# Patient Record
Sex: FEMALE | Race: WHITE | NOT HISPANIC OR LATINO | Employment: OTHER | URBAN - METROPOLITAN AREA
[De-identification: names, ages, dates, MRNs, and addresses within clinical notes are randomized per-mention and may not be internally consistent; named-entity substitution may affect disease eponyms.]

---

## 2017-03-01 ENCOUNTER — HOSPITAL ENCOUNTER (EMERGENCY)
Facility: HOSPITAL | Age: 42
Discharge: HOME | End: 2017-03-01
Attending: FAMILY MEDICINE | Admitting: FAMILY MEDICINE

## 2017-03-01 ENCOUNTER — APPOINTMENT (OUTPATIENT)
Dept: ULTRASOUND IMAGING | Facility: HOSPITAL | Age: 42
End: 2017-03-01

## 2017-03-01 VITALS
DIASTOLIC BLOOD PRESSURE: 83 MMHG | BODY MASS INDEX: 28.34 KG/M2 | RESPIRATION RATE: 19 BRPM | WEIGHT: 154 LBS | TEMPERATURE: 97.2 F | OXYGEN SATURATION: 98 % | HEART RATE: 72 BPM | SYSTOLIC BLOOD PRESSURE: 122 MMHG | HEIGHT: 62 IN

## 2017-03-01 DIAGNOSIS — N93.9 VAGINAL BLEEDING: Primary | ICD-10-CM

## 2017-03-01 LAB
ANION GAP SERPL CALCULATED.3IONS-SCNC: 14.6 MMOL/L
BASOPHILS # BLD AUTO: 0.04 10*3/MM3 (ref 0–0.2)
BASOPHILS NFR BLD AUTO: 0.5 % (ref 0–1.5)
BUN BLD-MCNC: 9 MG/DL (ref 6–20)
BUN/CREAT SERPL: 10.7 (ref 7–25)
CALCIUM SPEC-SCNC: 8.7 MG/DL (ref 8.6–10.5)
CHLORIDE SERPL-SCNC: 102 MMOL/L (ref 98–107)
CO2 SERPL-SCNC: 25.4 MMOL/L (ref 22–29)
CREAT BLD-MCNC: 0.84 MG/DL (ref 0.57–1)
DEPRECATED RDW RBC AUTO: 45.5 FL (ref 37–54)
EOSINOPHIL # BLD AUTO: 0.14 10*3/MM3 (ref 0–0.7)
EOSINOPHIL NFR BLD AUTO: 1.6 % (ref 0.3–6.2)
ERYTHROCYTE [DISTWIDTH] IN BLOOD BY AUTOMATED COUNT: 13.2 % (ref 11.7–13)
GFR SERPL CREATININE-BSD FRML MDRD: 74 ML/MIN/1.73
GLUCOSE BLD-MCNC: 81 MG/DL (ref 65–99)
HCG SERPL QL: NEGATIVE
HCT VFR BLD AUTO: 37.1 % (ref 35.6–45.5)
HGB BLD-MCNC: 12.3 G/DL (ref 11.9–15.5)
IMM GRANULOCYTES # BLD: 0.03 10*3/MM3 (ref 0–0.03)
IMM GRANULOCYTES NFR BLD: 0.4 % (ref 0–0.5)
LYMPHOCYTES # BLD AUTO: 2.2 10*3/MM3 (ref 0.9–4.8)
LYMPHOCYTES NFR BLD AUTO: 25.7 % (ref 19.6–45.3)
MCH RBC QN AUTO: 31.9 PG (ref 26.9–32)
MCHC RBC AUTO-ENTMCNC: 33.2 G/DL (ref 32.4–36.3)
MCV RBC AUTO: 96.4 FL (ref 80.5–98.2)
MONOCYTES # BLD AUTO: 0.69 10*3/MM3 (ref 0.2–1.2)
MONOCYTES NFR BLD AUTO: 8.1 % (ref 5–12)
NEUTROPHILS # BLD AUTO: 5.47 10*3/MM3 (ref 1.9–8.1)
NEUTROPHILS NFR BLD AUTO: 63.7 % (ref 42.7–76)
PLATELET # BLD AUTO: 301 10*3/MM3 (ref 140–500)
PMV BLD AUTO: 11.2 FL (ref 6–12)
POTASSIUM BLD-SCNC: 3.8 MMOL/L (ref 3.5–5.2)
RBC # BLD AUTO: 3.85 10*6/MM3 (ref 3.9–5.2)
SODIUM BLD-SCNC: 142 MMOL/L (ref 136–145)
WBC NRBC COR # BLD: 8.57 10*3/MM3 (ref 4.5–10.7)
WHOLE BLOOD HOLD SPECIMEN: NORMAL

## 2017-03-01 PROCEDURE — 76830 TRANSVAGINAL US NON-OB: CPT

## 2017-03-01 PROCEDURE — 99283 EMERGENCY DEPT VISIT LOW MDM: CPT

## 2017-03-01 PROCEDURE — 36415 COLL VENOUS BLD VENIPUNCTURE: CPT | Performed by: FAMILY MEDICINE

## 2017-03-01 PROCEDURE — 85025 COMPLETE CBC W/AUTO DIFF WBC: CPT | Performed by: FAMILY MEDICINE

## 2017-03-01 PROCEDURE — 80048 BASIC METABOLIC PNL TOTAL CA: CPT | Performed by: FAMILY MEDICINE

## 2017-03-01 PROCEDURE — 84703 CHORIONIC GONADOTROPIN ASSAY: CPT | Performed by: FAMILY MEDICINE

## 2017-03-01 PROCEDURE — 76856 US EXAM PELVIC COMPLETE: CPT

## 2017-03-01 PROCEDURE — 93976 VASCULAR STUDY: CPT

## 2017-03-01 RX ORDER — LEVOTHYROXINE SODIUM 0.12 MG/1
125 TABLET ORAL DAILY
COMMUNITY

## 2017-03-01 RX ORDER — OMEPRAZOLE 20 MG/1
20 CAPSULE, DELAYED RELEASE ORAL DAILY
COMMUNITY
End: 2017-03-09

## 2017-03-01 RX ORDER — SODIUM CHLORIDE 0.9 % (FLUSH) 0.9 %
10 SYRINGE (ML) INJECTION AS NEEDED
Status: DISCONTINUED | OUTPATIENT
Start: 2017-03-01 | End: 2017-03-01 | Stop reason: HOSPADM

## 2017-03-01 RX ORDER — ERGOCALCIFEROL 1.25 MG/1
50000 CAPSULE ORAL WEEKLY
COMMUNITY
End: 2017-03-09

## 2017-03-01 RX ORDER — PROPRANOLOL HYDROCHLORIDE 80 MG/1
80 CAPSULE, EXTENDED RELEASE ORAL DAILY
COMMUNITY

## 2017-03-01 RX ORDER — FENOFIBRATE 145 MG/1
145 TABLET, COATED ORAL DAILY
COMMUNITY

## 2017-03-01 NOTE — ED PROVIDER NOTES
EMERGENCY DEPARTMENT ENCOUNTER    CHIEF COMPLAINT  Chief Complaint: Vaginal bleeding  History given by: Pt  History limited by: N/A  Room Number: 14/14  PMD: Fernie Nicole MD      HPI:  Pt is a 41 y.o. female who presents complaining of persistent vaginal spotting since her LMP ended Jan 12th. Pt also c/o abd cramping since Feb 23rd. She was seen by her urologist earlier today, who told the pt to call her OBGYN as there were no issues with her kidneys. Dr. Flores is her OBGYN. Pt was advised by their office to come here, as she was unable to get an appointment in the office until next week. Pt has a history of thyroid cancer 3 times.    Duration: 1.5 month  Onset: Sudden  Timing: Constant  Location:   Radiation: None  Quality: Spotting  Intensity/Severity: Mild  Progression: Unchanged  Associated Symptoms: Abd cramping  Aggravating Factors: Nothing  Alleviating Factors: Nothing  Previous Episodes: None specified  Treatment before arrival: Seen by Nephrologist and OBGYN's office    PAST MEDICAL HISTORY  Active Ambulatory Problems     Diagnosis Date Noted   • No Active Ambulatory Problems     Resolved Ambulatory Problems     Diagnosis Date Noted   • No Resolved Ambulatory Problems     Past Medical History   Diagnosis Date   • Arthritis    • Cancer    • Disease of thyroid gland    • GERD (gastroesophageal reflux disease)    • Hernia    • Kidney stone    • Spinal stenosis        PAST SURGICAL HISTORY  Past Surgical History   Procedure Laterality Date   • Thyroid surgery     • Cholecystectomy     • Appendectomy     • Tubal abdominal ligation     • Colonoscopy         FAMILY HISTORY  History reviewed. No pertinent family history.    SOCIAL HISTORY  Social History     Social History   • Marital status:      Spouse name: N/A   • Number of children: N/A   • Years of education: N/A     Occupational History   • Not on file.     Social History Main Topics   • Smoking status: Never Smoker   • Smokeless  tobacco: Not on file   • Alcohol use No   • Drug use: No   • Sexual activity: Not on file     Other Topics Concern   • Not on file     Social History Narrative   • No narrative on file       ALLERGIES  Review of patient's allergies indicates no known allergies.    REVIEW OF SYSTEMS  Review of Systems   Constitutional: Negative for chills and fever.   HENT: Negative for sore throat and trouble swallowing.    Eyes: Negative for visual disturbance.   Respiratory: Negative for cough and shortness of breath.    Cardiovascular: Negative for chest pain, palpitations and leg swelling.   Gastrointestinal: Positive for abdominal pain (cramping). Negative for diarrhea and vomiting.   Endocrine: Negative.    Genitourinary: Positive for vaginal bleeding (spotting). Negative for decreased urine volume, dysuria and frequency.   Musculoskeletal: Negative for neck pain.   Skin: Negative for rash.   Allergic/Immunologic: Negative.    Neurological: Negative for syncope, weakness, numbness and headaches.   Hematological: Negative.    Psychiatric/Behavioral: Negative.    All other systems reviewed and are negative.      PHYSICAL EXAM  ED Triage Vitals   Temp Heart Rate Resp BP SpO2   03/01/17 1344 03/01/17 1344 03/01/17 1404 03/01/17 1404 03/01/17 1344   97.6 °F (36.4 °C) 67 18 161/100 100 %      Temp src Heart Rate Source Patient Position BP Location FiO2 (%)   -- 03/01/17 1536 03/01/17 1404 03/01/17 1404 --    Monitor Sitting Right arm        Physical Exam   Constitutional: She is oriented to person, place, and time and well-developed, well-nourished, and in no distress. No distress.   HENT:   Head: Normocephalic and atraumatic.   Eyes: EOM are normal. Pupils are equal, round, and reactive to light.   Neck: Normal range of motion. Neck supple.   Cardiovascular: Normal rate, regular rhythm and normal heart sounds.    Pulmonary/Chest: Effort normal and breath sounds normal. No respiratory distress.   Abdominal: Soft. There is no  tenderness (no significant tenderness). There is no rebound, no guarding and no CVA tenderness.   Musculoskeletal: Normal range of motion. She exhibits no edema.   Neurological: She is alert and oriented to person, place, and time. She has normal sensation and normal strength.   Skin: Skin is warm and dry. No rash noted.   Psychiatric: Mood and affect normal.   Nursing note and vitals reviewed.      LAB RESULTS  Lab Results (last 24 hours)     Procedure Component Value Units Date/Time    CBC & Differential [[id number]] Collected:  03/01/17 1425    Specimen:  Blood Updated:  03/01/17 1503    Narrative:       The following orders were created for panel order CBC & Differential.  Procedure                               Abnormality         Status                     ---------                               -----------         ------                     CBC Auto Differential[[id number]]         Abnormal            Final result                 Please view results for these tests on the individual orders.    Basic Metabolic Panel [[id number]] Collected:  03/01/17 1425    Specimen:  Blood Updated:  03/01/17 1504     Glucose 81 mg/dL      BUN 9 mg/dL      Creatinine 0.84 mg/dL      Sodium 142 mmol/L      Potassium 3.8 mmol/L      Chloride 102 mmol/L      CO2 25.4 mmol/L      Calcium 8.7 mg/dL      eGFR Non African Amer 74 mL/min/1.73      BUN/Creatinine Ratio 10.7      Anion Gap 14.6 mmol/L     Narrative:       GFR Normal >60  Chronic Kidney Disease <60  Kidney Failure <15    hCG, Serum, Qualitative [[id number]]  (Normal) Collected:  03/01/17 1425    Specimen:  Blood Updated:  03/01/17 1453     HCG Qualitative Negative     CBC Auto Differential [[id number]]  (Abnormal) Collected:  03/01/17 1425    Specimen:  Blood Updated:  03/01/17 1503     WBC 8.57 10*3/mm3      RBC 3.85 (L) 10*6/mm3      Hemoglobin 12.3 g/dL      Hematocrit 37.1 %      MCV 96.4 fL      MCH 31.9 pg      MCHC 33.2 g/dL      RDW 13.2 (H) %      RDW-SD 45.5 fl      MPV  11.2 fL      Platelets 301 10*3/mm3      Neutrophil % 63.7 %      Lymphocyte % 25.7 %      Monocyte % 8.1 %      Eosinophil % 1.6 %      Basophil % 0.5 %      Immature Grans % 0.4 %      Neutrophils, Absolute 5.47 10*3/mm3      Lymphocytes, Absolute 2.20 10*3/mm3      Monocytes, Absolute 0.69 10*3/mm3      Eosinophils, Absolute 0.14 10*3/mm3      Basophils, Absolute 0.04 10*3/mm3      Immature Grans, Absolute 0.03 10*3/mm3           I ordered the above labs and reviewed the results    RADIOLOGY  US Pelvis Complete   Preliminary Result   Largely unremarkable pelvic sonogram. A small fibroid is   noted within the posterior corpus. The endometrium is up to 2 cm in   thickness, consistent with patient's reported LMP of 01/07/2017. There   is a 2 cm slightly complex cyst at the right ovary. Numerous internal   septations are present and I suspect this represents a resolving   hemorrhagic cyst. Followup pelvic sonography could be obtained to   document resolution.       NOTE: These findings and recommendations were discussed with Dr. Fantasma Abdullahi of the ER at the time of the dictation.          US Non-ob Transvaginal    (Results Pending)   US Testicular or Ovarian Vascular Limited    (Results Pending)   Ultrasound Pelvis:   Shows small hemorrhagic cyst on the R that appears to be resolving, small fibroid    I ordered the above noted radiological studies. Interpreted by radiologist. Discussed with radiologist (Dr. Denny). Reviewed by me in PACS.       PROCEDURES  Procedures      PROGRESS AND CONSULTS  ED Course   3:57 PM:  Vitals: BP: 128/80 HR: 67 Temp: 97.6 °F (36.4 °C) O2 sat: 97%  D/w pt plan for labs and Pelvic Ultrasound for further evaluation. Pt understands and agrees with the plan, all questions answered.    5:08 PM:  Vitals: BP: 138/85 HR: 85 Temp: 97.6 °F (36.4 °C) O2 sat: 98%  Rechecked pt. Pt is resting comfortably. Discussed results of labs and imaging, which showed small hemorrhagic cyst that appears to  be resolving and a small fibroid. Also discussed the plan for discharge.  She is to see hr GYN MD next week. Pt understands and agrees with the plan, all questions answered.    MEDICAL DECISION MAKING  Results were reviewed/discussed with the patient and they were also made aware of online access. Pt also made aware that some labs, such as cultures, will not be resulted during ER visit and follow up with PMD is necessary.     MDM  Number of Diagnoses or Management Options     Amount and/or Complexity of Data Reviewed  Clinical lab tests: ordered and reviewed (Normal)  Tests in the radiology section of CPT®: ordered and reviewed (Ultrasound Pelvis: Shows small hemorrhagic cyst on the R that appears to be resolving, small fibroid)  Discussion of test results with the performing providers: yes  Decide to obtain previous medical records or to obtain history from someone other than the patient: yes  Review and summarize past medical records: yes (Pt had an ultrasound 3 weeks ago, which showed an 8 mm stone in the lower pole of the L kidney, which was unchanged from previous. Her urine was clear at that time.)  Independent visualization of images, tracings, or specimens: yes    Patient Progress  Patient progress: stable         DIAGNOSIS  Final diagnoses:   Vaginal bleeding       DISPOSITION  DISCHARGE    Patient discharged in stable condition.    Reviewed implications of results, diagnosis, meds, responsibility to follow up, warning signs and symptoms of possible worsening, potential complications and reasons to return to ER, including any new or worsening symptoms.    Patient/Family voiced understanding of above instructions.    Discussed plan for discharge, as there is no emergent indication for admission.  Pt/family is agreeable and understands need for follow up and repeat testing.  Pt is aware that discharge does not mean that nothing is wrong but it indicates no emergency is present that requires admission and they  must continue care with follow-up as given below or physician of their choice.     FOLLOW-UP  Renny Garcia MD  [facility]   [place] [address] [place] [id number]  [phone]      Keep your appt         Medication List      Notice     No changes were made to your prescriptions during this visit.        Latest Documented Vital Signs:  As of 8:08 PM  BP- 122/83 HR- 72 Temp- 97.2 °F (36.2 °C) (Tympanic) O2 sat- 98%    --  Documentation assistance provided by tabatha Palmer for Dr. Abdullahi.  Information recorded by the scribe was done at my direction and has been verified and validated by me.     Elvin Palmer  03/01/17 1711       Fantasma Abdullahi MD  03/01/17 2009

## 2017-03-01 NOTE — ED NOTES
Spotting blood entire month of Feb. Also have abd cramping/low back pains. Pt called her Gyno who referred to LISHA Bullock RN  03/01/17 9094

## 2017-03-09 ENCOUNTER — OFFICE VISIT (OUTPATIENT)
Dept: OBSTETRICS AND GYNECOLOGY | Facility: CLINIC | Age: 42
End: 2017-03-09

## 2017-03-09 VITALS
SYSTOLIC BLOOD PRESSURE: 121 MMHG | HEIGHT: 62 IN | HEART RATE: 65 BPM | BODY MASS INDEX: 27.97 KG/M2 | WEIGHT: 152 LBS | DIASTOLIC BLOOD PRESSURE: 78 MMHG

## 2017-03-09 DIAGNOSIS — N92.1 MENOMETRORRHAGIA: Primary | ICD-10-CM

## 2017-03-09 DIAGNOSIS — Z11.3 SCREEN FOR STD (SEXUALLY TRANSMITTED DISEASE): ICD-10-CM

## 2017-03-09 DIAGNOSIS — R23.2 HOT FLASHES: ICD-10-CM

## 2017-03-09 DIAGNOSIS — E07.9 THYROID DISEASE: ICD-10-CM

## 2017-03-09 DIAGNOSIS — N89.8 VAGINAL DISCHARGE: ICD-10-CM

## 2017-03-09 LAB
T4 FREE SERPL-MCNC: 1.51 NG/DL (ref 0.93–1.7)
TSH SERPL DL<=0.005 MIU/L-ACNC: 2.29 MIU/ML (ref 0.27–4.2)

## 2017-03-09 PROCEDURE — 99214 OFFICE O/P EST MOD 30 MIN: CPT | Performed by: OBSTETRICS & GYNECOLOGY

## 2017-03-09 RX ORDER — IBUPROFEN 200 MG
1200 CAPSULE ORAL
COMMUNITY

## 2017-03-09 RX ORDER — OMEPRAZOLE 40 MG/1
40 CAPSULE, DELAYED RELEASE ORAL
COMMUNITY

## 2017-03-09 RX ORDER — MEDROXYPROGESTERONE ACETATE 10 MG/1
TABLET ORAL
Qty: 10 TABLET | Refills: 1 | Status: SHIPPED | OUTPATIENT
Start: 2017-03-09 | End: 2017-04-13 | Stop reason: SDUPTHER

## 2017-03-09 RX ORDER — MELATONIN
1000 2 TIMES DAILY
COMMUNITY

## 2017-03-09 RX ORDER — ACETAMINOPHEN 500 MG
500 TABLET ORAL EVERY 6 HOURS
COMMUNITY

## 2017-03-09 RX ORDER — DICLOFENAC SODIUM AND MISOPROSTOL 50; 200 MG/1; UG/1
1 TABLET, DELAYED RELEASE ORAL 3 TIMES DAILY
COMMUNITY

## 2017-03-09 RX ORDER — MULTIVITAMIN
1 TABLET ORAL
COMMUNITY

## 2017-03-09 NOTE — PROGRESS NOTES
Naun Machuca is a 41 y.o. female   CC: Irregular bleeding, abdominal pain  History of Present Illness  Patient reports that she began having problems with her periods in February 2017.  She basically reports that she had bleeding every day of the month in February.  Initially, the bleeding was light like spotting.  Last week, the bleeding became heavier with the passage of clots.  She went to the emergency room on 3/1/17.  An ultrasound was performed that was basically normal, with the exception of an endometrial lining of 2 cm.  A small 1 Center posterior fibroid was also noted.  CBC was normal at that time.  The patient also reports lower abdominal pain.  She also reports occasional pain shooting down her legs.  The patient has been sexually active with her , but recently has had difficulty with intercourse secondary to pain and irregular bleeding.  The patient does have a somewhat complex past medical history.  She has a history of thyroid cancer with several recurrences.  The patient reports that the past few months, her thyroid dysfunction has been worsening.  Her endocrinologist in primary care physician or attempting to evaluate the patient for possible recurrence of thyroid cancer and possible need for further treatment.  The patient also has a history of degenerative disc disease and L-spine disease.  She has done various therapies including steroid injections, physical therapy, etc.  She is trying to avoid having surgery for this.  The patient does notice some hot flashes, night sweats, changes to her weight; however, she recognizes that these may all also be secondary to her thyroid dysfunction.    The following portions of the patient's history were reviewed and updated as appropriate: allergies, current medications, past family history, past medical history, past social history, past surgical history and problem list.    Review of Systems  General: No fever or chills; pos hot flashes  "and night sweats  Constitutional: Both weight loss and gain, no hair loss  HENT: No headache, no hearing loss, no tinnitus  Eyes: normal vision, no eye pain  Lungs: No cough, no shortness of breath  Heart: No chest pain, no palpitations  Abdomen: No nausea, vomiting, constipation or diarrhea  : No dysuria, no hematuria  Skin: No rashes  Lymph: No swelling  Neuro: No parathesia, no weakness  Psych: Normal though content, no hallucinations, no SI/HI    Objective   Physical Exam  Vitals:    03/09/17 1036   BP: 121/78   Pulse: 65   Weight: 152 lb (68.9 kg)   Height: 62\" (157.5 cm)   LMP: current  Gen: No acute distress, awake and oriented times three  Abdomen: soft, nontender, non distended, normoactive bowel sounds  Pelvic:   Normal external female genitalia, no lesions  Vagina: Large amount of blood with no clots  Cervix: No cervical motion tenderness, no lesions, no masses  Uterus: Anteverted, normal size and shape, mildly tender  Adnexa: No masses or tenderness  Rectal: Deferred  Psych: Good judgement and insight, normal affect and mood      Assessment/Plan   Diagnoses and all orders for this visit:    Menometrorrhagia, new problem  -     Follicle Stimulating Hormone  -     Estradiol  -     TSH  -     T4, Free  -     Chlamydia trachomatis, Neisseria gonorrhoeae, Trichomonas vaginalis, PCR  -     medroxyPROGESTERone (PROVERA) 10 MG tablet; Take 1 pill daily for 10 days each month.  Repeat the course each month.    Vaginal discharge, new problem  -     Chlamydia trachomatis, Neisseria gonorrhoeae, Trichomonas vaginalis, PCR    Hot flashes, new problem  -     Follicle Stimulating Hormone  -     Estradiol  -     TSH  -     T4, Free    Screen for STD (sexually transmitted disease)  -     Chlamydia trachomatis, Neisseria gonorrhoeae, Trichomonas vaginalis, PCR    Thyroid disease, worsening  -     TSH  -     T4, Free  I suspect the patient's new onset of her irregular bleeding is likely secondary to her thyroid " dysfunction.  The patient has had some alterations in her bleeding in the past that she has seen me for.  Traction performed to endometrial biopsies on this patient in  and again in  that were both normal.  She is having an acute increase in her bleeding at this time.  My recommendation will be to do baseline lab testing today including FSH and estradiol to evaluate for signs of  menopause/perimenopausal which may also be an expiration for the changes of her menses.  We also check her thyroid function testing today.  After lab testing today, I would recommend starting on a daily Provera pill 10 mg for 10 straight days each month.  I suspect this will help regulate her menses and lessen her bleeding overall.  I would also repeat this next month.  I would like to repeat her pelvic ultrasound next month to be sure that the uterine lining is finishing with the progesterone.  I do not feel another endometrial biopsy is warranted at this time.  I would also recommend gonorrhea and chlamydia testing to exclude an infectious source which could explain a sudden changes to her bleeding, although I feel this is less likely that her thyroid dysfunction.  The patient's abdominal pain may be related to her bleeding; however, patient also has many other possible causes of abdominal pain, especially given her L-spine disease.  Labs and cultures today.  Pelvic ultrasound in about 4 weeks.  Return to the office in 4 weeks.  Start cyclic Provera 10 mg.    I spent 20 out of 25 minutes with the patient in face to face counseling of the above issues.

## 2017-03-10 LAB
ESTRADIOL SERPL-MCNC: 69.2 PG/ML
FSH SERPL-ACNC: 11 MIU/ML

## 2017-03-11 LAB
C TRACH RRNA SPEC QL NAA+PROBE: NEGATIVE
N GONORRHOEA RRNA SPEC QL NAA+PROBE: NEGATIVE
T VAGINALIS RRNA SPEC QL NAA+PROBE: NEGATIVE

## 2017-03-14 ENCOUNTER — TELEPHONE (OUTPATIENT)
Dept: OBSTETRICS AND GYNECOLOGY | Facility: CLINIC | Age: 42
End: 2017-03-14

## 2017-03-14 NOTE — TELEPHONE ENCOUNTER
----- Message from Renny Garcia MD sent at 3/14/2017  1:14 PM EDT -----  Let the patient know that her cultures were negative.  Her thyroid tests were normal.  Her blood work shows that she may be getting close to menopause, and this is likely the reason for the changes to her periods.  I would plan to see her back next month as scheduled.

## 2017-04-13 ENCOUNTER — OFFICE VISIT (OUTPATIENT)
Dept: OBSTETRICS AND GYNECOLOGY | Facility: CLINIC | Age: 42
End: 2017-04-13

## 2017-04-13 ENCOUNTER — PROCEDURE VISIT (OUTPATIENT)
Dept: OBSTETRICS AND GYNECOLOGY | Facility: CLINIC | Age: 42
End: 2017-04-13

## 2017-04-13 VITALS
WEIGHT: 156.2 LBS | HEART RATE: 47 BPM | HEIGHT: 62 IN | DIASTOLIC BLOOD PRESSURE: 79 MMHG | SYSTOLIC BLOOD PRESSURE: 123 MMHG | BODY MASS INDEX: 28.74 KG/M2

## 2017-04-13 DIAGNOSIS — D25.9 UTERINE LEIOMYOMA, UNSPECIFIED LOCATION: ICD-10-CM

## 2017-04-13 DIAGNOSIS — N92.4 ABNORMAL PERIMENOPAUSAL BLEEDING: Primary | ICD-10-CM

## 2017-04-13 DIAGNOSIS — N92.1 MENOMETRORRHAGIA: ICD-10-CM

## 2017-04-13 DIAGNOSIS — N92.1 IRREGULAR INTERMENSTRUAL BLEEDING: Primary | ICD-10-CM

## 2017-04-13 PROCEDURE — 76830 TRANSVAGINAL US NON-OB: CPT | Performed by: OBSTETRICS & GYNECOLOGY

## 2017-04-13 PROCEDURE — 99213 OFFICE O/P EST LOW 20 MIN: CPT | Performed by: OBSTETRICS & GYNECOLOGY

## 2017-04-13 RX ORDER — MEDROXYPROGESTERONE ACETATE 10 MG/1
TABLET ORAL
Qty: 10 TABLET | Refills: 1 | Status: SHIPPED | OUTPATIENT
Start: 2017-04-13

## 2017-04-13 NOTE — PROGRESS NOTES
"Subjective   Renae Machuca is a 41 y.o. female   CC: Follow-up of irregular bleeding    History of Present Illness  Patient is here for follow-up of irregular bleeding.  She has numerous somatic complaints that are not related to her gynecologic problem.  She is complaining of epigastric pain, heartburn, reflux, and back pain.  She's been trying to call her gastroenterologist all day but has not been able to reach them.  I've advised her to go to the urgent care immediate care center if necessary to continue try to work to get in with her gastroenterologist.  Related to her gynecologic problems, she did take the Provera last month, and her periods stopped with the Provera, but returned about 2-3 days after completing treatment.  She has had some bleeding off and on since then.  She is due to start her next round of Provera tomorrow.  Lab work last time was basically normal with the exception of elevated FSH and borderline low estradiol, likely perimenopausal      The following portions of the patient's history were reviewed and updated as appropriate: allergies, current medications, past family history, past medical history, past social history, past surgical history and problem list.    Review of Systems  Eyes: normal vision, no eye pain  Abdomen: No nausea, vomiting, constipation or diarrhea  : No dysuria, no hematuria  Skin: No rashes  Lymph: No swelling  Neuro: No parathesia, no weakness  Psych: Normal though content, no hallucinations, no SI/HI    Objective   Physical Exam  Vitals:    04/13/17 1423   BP: 123/79   Pulse: (!) 47   Weight: 156 lb 3.2 oz (70.9 kg)   Height: 62\" (157.5 cm)   Gen: No acute distress, awake and oriented times three  Pelvic:  Deferred  Psych: Good judgement and insight, normal affect and mood    Ultrasound today: Normal-appearing uterus and ovaries.  Very small roughly 1 cm posterior fibroid noted.  Otherwise normal findings.    Assessment/Plan   Renae was seen today for " menometrorrhagia.    Diagnoses and all orders for this visit:    Abnormal perimenopausal bleeding    Menometrorrhagia  -     medroxyPROGESTERone (PROVERA) 10 MG tablet; Take 1 pill daily for 10 days each month.  Repeat the course each month.    I believe the patient's irregular bleeding is likely secondary to perimenopause.  I recommend continued trial of cyclic Provera for the next 2 months.  If this does not resolve her irregular bleeding, I would recommend transitioning to daily continuous Provera.  The risks, benefits, alternatives, and side effects of this therapy were discussed with the patient.  She verbalized understanding.  She should return to the office to see me in about 2 months.    I spent 12 out of 15 minutes with the patient in face to face counseling of the above issues.

## 2018-11-07 ENCOUNTER — OFFICE (OUTPATIENT)
Dept: URBAN - METROPOLITAN AREA CLINIC 75 | Facility: CLINIC | Age: 43
End: 2018-11-07

## 2018-11-07 VITALS
SYSTOLIC BLOOD PRESSURE: 124 MMHG | WEIGHT: 131 LBS | HEIGHT: 62 IN | HEART RATE: 60 BPM | DIASTOLIC BLOOD PRESSURE: 72 MMHG

## 2018-11-07 DIAGNOSIS — R13.10 DYSPHAGIA, UNSPECIFIED: ICD-10-CM

## 2018-11-07 DIAGNOSIS — K59.00 CONSTIPATION, UNSPECIFIED: ICD-10-CM

## 2018-11-07 DIAGNOSIS — Z86.010 PERSONAL HISTORY OF COLONIC POLYPS: ICD-10-CM

## 2018-11-07 DIAGNOSIS — R19.7 DIARRHEA, UNSPECIFIED: ICD-10-CM

## 2018-11-07 DIAGNOSIS — K21.9 GASTRO-ESOPHAGEAL REFLUX DISEASE WITHOUT ESOPHAGITIS: ICD-10-CM

## 2018-11-07 PROCEDURE — 99204 OFFICE O/P NEW MOD 45 MIN: CPT | Performed by: INTERNAL MEDICINE

## 2018-12-17 VITALS
DIASTOLIC BLOOD PRESSURE: 55 MMHG | SYSTOLIC BLOOD PRESSURE: 134 MMHG | OXYGEN SATURATION: 100 % | SYSTOLIC BLOOD PRESSURE: 94 MMHG | HEART RATE: 58 BPM | HEART RATE: 59 BPM | DIASTOLIC BLOOD PRESSURE: 73 MMHG | SYSTOLIC BLOOD PRESSURE: 100 MMHG | RESPIRATION RATE: 16 BRPM | OXYGEN SATURATION: 92 % | DIASTOLIC BLOOD PRESSURE: 56 MMHG | DIASTOLIC BLOOD PRESSURE: 76 MMHG | SYSTOLIC BLOOD PRESSURE: 149 MMHG | HEART RATE: 55 BPM | SYSTOLIC BLOOD PRESSURE: 118 MMHG | SYSTOLIC BLOOD PRESSURE: 136 MMHG | HEART RATE: 60 BPM | OXYGEN SATURATION: 99 % | TEMPERATURE: 97.6 F | HEART RATE: 85 BPM | DIASTOLIC BLOOD PRESSURE: 65 MMHG | HEART RATE: 108 BPM | DIASTOLIC BLOOD PRESSURE: 40 MMHG | DIASTOLIC BLOOD PRESSURE: 49 MMHG | SYSTOLIC BLOOD PRESSURE: 114 MMHG | DIASTOLIC BLOOD PRESSURE: 38 MMHG | TEMPERATURE: 98.1 F | OXYGEN SATURATION: 98 % | DIASTOLIC BLOOD PRESSURE: 50 MMHG | SYSTOLIC BLOOD PRESSURE: 93 MMHG | DIASTOLIC BLOOD PRESSURE: 69 MMHG | HEART RATE: 57 BPM | OXYGEN SATURATION: 94 % | WEIGHT: 131 LBS | HEIGHT: 62 IN | SYSTOLIC BLOOD PRESSURE: 105 MMHG | HEART RATE: 109 BPM | HEART RATE: 53 BPM | RESPIRATION RATE: 18 BRPM | OXYGEN SATURATION: 96 % | SYSTOLIC BLOOD PRESSURE: 84 MMHG | HEART RATE: 54 BPM | HEART RATE: 69 BPM | SYSTOLIC BLOOD PRESSURE: 88 MMHG

## 2018-12-19 ENCOUNTER — OFFICE (OUTPATIENT)
Dept: URBAN - METROPOLITAN AREA PATHOLOGY 4 | Facility: PATHOLOGY | Age: 43
End: 2018-12-19

## 2018-12-19 ENCOUNTER — AMBULATORY SURGICAL CENTER (OUTPATIENT)
Dept: URBAN - METROPOLITAN AREA SURGERY 17 | Facility: SURGERY | Age: 43
End: 2018-12-19
Payer: COMMERCIAL

## 2018-12-19 DIAGNOSIS — Z86.010 PERSONAL HISTORY OF COLONIC POLYPS: ICD-10-CM

## 2018-12-19 DIAGNOSIS — D12.0 BENIGN NEOPLASM OF CECUM: ICD-10-CM

## 2018-12-19 DIAGNOSIS — D12.2 BENIGN NEOPLASM OF ASCENDING COLON: ICD-10-CM

## 2018-12-19 DIAGNOSIS — K22.70 BARRETT'S ESOPHAGUS WITHOUT DYSPLASIA: ICD-10-CM

## 2018-12-19 DIAGNOSIS — D12.3 BENIGN NEOPLASM OF TRANSVERSE COLON: ICD-10-CM

## 2018-12-19 DIAGNOSIS — K59.00 CONSTIPATION, UNSPECIFIED: ICD-10-CM

## 2018-12-19 DIAGNOSIS — K21.9 GASTRO-ESOPHAGEAL REFLUX DISEASE WITHOUT ESOPHAGITIS: ICD-10-CM

## 2018-12-19 DIAGNOSIS — R19.7 DIARRHEA, UNSPECIFIED: ICD-10-CM

## 2018-12-19 DIAGNOSIS — R13.10 DYSPHAGIA, UNSPECIFIED: ICD-10-CM

## 2018-12-19 LAB
GI HISTOLOGY: A. UNSPECIFIED: (no result)
GI HISTOLOGY: B. UNSPECIFIED: (no result)
GI HISTOLOGY: C. UNSPECIFIED: (no result)
GI HISTOLOGY: D. UNSPECIFIED: (no result)
GI HISTOLOGY: E. UNSPECIFIED: (no result)
GI HISTOLOGY: PDF REPORT: (no result)

## 2018-12-19 PROCEDURE — 45385 COLONOSCOPY W/LESION REMOVAL: CPT | Performed by: INTERNAL MEDICINE

## 2018-12-19 PROCEDURE — 43249 ESOPH EGD DILATION <30 MM: CPT | Performed by: INTERNAL MEDICINE

## 2018-12-19 PROCEDURE — 88305 TISSUE EXAM BY PATHOLOGIST: CPT | Performed by: INTERNAL MEDICINE

## 2018-12-19 PROCEDURE — 45380 COLONOSCOPY AND BIOPSY: CPT | Mod: 59 | Performed by: INTERNAL MEDICINE

## 2018-12-19 PROCEDURE — 43239 EGD BIOPSY SINGLE/MULTIPLE: CPT | Mod: 59 | Performed by: INTERNAL MEDICINE

## 2019-04-04 ENCOUNTER — OFFICE (OUTPATIENT)
Dept: URBAN - METROPOLITAN AREA CLINIC 75 | Facility: CLINIC | Age: 44
End: 2019-04-04

## 2019-04-04 VITALS
HEIGHT: 62 IN | WEIGHT: 135 LBS | DIASTOLIC BLOOD PRESSURE: 68 MMHG | SYSTOLIC BLOOD PRESSURE: 120 MMHG | HEART RATE: 78 BPM

## 2019-04-04 DIAGNOSIS — R13.10 DYSPHAGIA, UNSPECIFIED: ICD-10-CM

## 2019-04-04 DIAGNOSIS — K21.9 GASTRO-ESOPHAGEAL REFLUX DISEASE WITHOUT ESOPHAGITIS: ICD-10-CM

## 2019-04-04 DIAGNOSIS — R14.0 ABDOMINAL DISTENSION (GASEOUS): ICD-10-CM

## 2019-04-04 DIAGNOSIS — K31.89 OTHER DISEASES OF STOMACH AND DUODENUM: ICD-10-CM

## 2019-04-04 DIAGNOSIS — Z86.010 PERSONAL HISTORY OF COLONIC POLYPS: ICD-10-CM

## 2019-04-04 PROCEDURE — 99214 OFFICE O/P EST MOD 30 MIN: CPT | Performed by: INTERNAL MEDICINE

## 2019-04-04 RX ORDER — OMEPRAZOLE 40 MG/1
CAPSULE, DELAYED RELEASE ORAL
Qty: 30 | Refills: 1 | Status: ACTIVE

## 2019-06-20 VITALS
HEART RATE: 52 BPM | RESPIRATION RATE: 14 BRPM | WEIGHT: 133 LBS | DIASTOLIC BLOOD PRESSURE: 76 MMHG | HEART RATE: 60 BPM | HEIGHT: 63 IN | OXYGEN SATURATION: 95 % | RESPIRATION RATE: 21 BRPM | SYSTOLIC BLOOD PRESSURE: 103 MMHG | DIASTOLIC BLOOD PRESSURE: 88 MMHG | DIASTOLIC BLOOD PRESSURE: 85 MMHG | OXYGEN SATURATION: 96 % | SYSTOLIC BLOOD PRESSURE: 125 MMHG | HEART RATE: 54 BPM | TEMPERATURE: 96.9 F | DIASTOLIC BLOOD PRESSURE: 56 MMHG | RESPIRATION RATE: 20 BRPM | SYSTOLIC BLOOD PRESSURE: 142 MMHG | HEART RATE: 69 BPM | DIASTOLIC BLOOD PRESSURE: 68 MMHG | RESPIRATION RATE: 10 BRPM | OXYGEN SATURATION: 99 % | SYSTOLIC BLOOD PRESSURE: 117 MMHG | TEMPERATURE: 96.6 F | SYSTOLIC BLOOD PRESSURE: 141 MMHG | HEART RATE: 62 BPM

## 2019-06-21 ENCOUNTER — AMBULATORY SURGICAL CENTER (OUTPATIENT)
Dept: URBAN - METROPOLITAN AREA SURGERY 17 | Facility: SURGERY | Age: 44
End: 2019-06-21
Payer: COMMERCIAL

## 2019-06-21 ENCOUNTER — OFFICE (OUTPATIENT)
Dept: URBAN - METROPOLITAN AREA PATHOLOGY 4 | Facility: PATHOLOGY | Age: 44
End: 2019-06-21

## 2019-06-21 DIAGNOSIS — K31.7 POLYP OF STOMACH AND DUODENUM: ICD-10-CM

## 2019-06-21 DIAGNOSIS — T18.2XXA FOREIGN BODY IN STOMACH, INITIAL ENCOUNTER: ICD-10-CM

## 2019-06-21 DIAGNOSIS — K21.0 GASTRO-ESOPHAGEAL REFLUX DISEASE WITH ESOPHAGITIS: ICD-10-CM

## 2019-06-21 DIAGNOSIS — K29.60 OTHER GASTRITIS WITHOUT BLEEDING: ICD-10-CM

## 2019-06-21 DIAGNOSIS — K31.89 OTHER DISEASES OF STOMACH AND DUODENUM: ICD-10-CM

## 2019-06-21 DIAGNOSIS — R14.0 ABDOMINAL DISTENSION (GASEOUS): ICD-10-CM

## 2019-06-21 DIAGNOSIS — R13.10 DYSPHAGIA, UNSPECIFIED: ICD-10-CM

## 2019-06-21 PROBLEM — D13.1 BENIGN NEOPLASM OF STOMACH: Status: ACTIVE | Noted: 2019-06-21

## 2019-06-21 LAB
GI HISTOLOGY: A. SELECT: (no result)
GI HISTOLOGY: B. UNSPECIFIED: (no result)
GI HISTOLOGY: C. SELECT: (no result)
GI HISTOLOGY: D. UNSPECIFIED: (no result)
GI HISTOLOGY: E. UNSPECIFIED: (no result)
GI HISTOLOGY: PDF REPORT: (no result)

## 2019-06-21 PROCEDURE — 88305 TISSUE EXAM BY PATHOLOGIST: CPT | Performed by: INTERNAL MEDICINE

## 2019-06-21 PROCEDURE — 43239 EGD BIOPSY SINGLE/MULTIPLE: CPT | Performed by: INTERNAL MEDICINE

## 2019-06-21 NOTE — SERVICEHPINOTES
The patient presents for follow up. She has been seen by several GIs in the past and I have reviewed her workup as below. Main symptoms were nausea, vomiting, diarrhea, episodic. Certain foods such as peanuts would cause bloating, constipation, and she was able to avoid and slowly reintroduce them with success. Symptoms ongoing since early 2000s with postprandial diarrhea and urgency intermittently.Regarding reflux, she reports today that dysphagia has been intermittent, sometimes to cold beverages, with associated pain, will last minutes, will make herself belch and it will resolve. Has used Zofran for intermittent issues with nausea, has not needed to use them that much. There is some bloating, has not yet used a probiotic.Tried: carafate, Zofran (does help), omeprazole 40mg bid, Phenergan (makes her tired), lactose avoidance, NSAID avoidanceReports fam hx polyps in her mother.Data review:BREGD/CN Dr. Rachel 3/11: hiatal hernia, esophagitis, internal hemorrhoids, rectal polypBREGD/CN 6/14 Dr. Rachel: esophagitis, antral ulcers, hiatal hernia, 8mm transverse colon polypBREGD Dr. Rachel 10/15: esophagitis, gastritisBRCT a/p 5/16: neg, renal stone (noncontrast)BRLabs 3/18: nl lipse, CBC, CMPBRLabs 5/18: food allergen panel neg, neg TTG. TSH very low [follows up with her endocrinologist]BREGD/CN 12/18 as below, bx: barretts, no EoE. adenomas. EGD in 6 mo, CN in 3 yr

## 2019-10-09 ENCOUNTER — OFFICE (OUTPATIENT)
Dept: URBAN - METROPOLITAN AREA CLINIC 75 | Facility: CLINIC | Age: 44
End: 2019-10-09

## 2019-10-09 VITALS
HEART RATE: 68 BPM | DIASTOLIC BLOOD PRESSURE: 82 MMHG | WEIGHT: 133 LBS | SYSTOLIC BLOOD PRESSURE: 140 MMHG | HEIGHT: 63 IN

## 2019-10-09 DIAGNOSIS — R13.10 DYSPHAGIA, UNSPECIFIED: ICD-10-CM

## 2019-10-09 DIAGNOSIS — K21.9 GASTRO-ESOPHAGEAL REFLUX DISEASE WITHOUT ESOPHAGITIS: ICD-10-CM

## 2019-10-09 DIAGNOSIS — Z86.010 PERSONAL HISTORY OF COLONIC POLYPS: ICD-10-CM

## 2019-10-09 DIAGNOSIS — F41.9 ANXIETY DISORDER, UNSPECIFIED: ICD-10-CM

## 2019-10-09 PROCEDURE — 99213 OFFICE O/P EST LOW 20 MIN: CPT | Performed by: INTERNAL MEDICINE
